# Patient Record
Sex: FEMALE | Race: WHITE | NOT HISPANIC OR LATINO | Employment: FULL TIME | URBAN - METROPOLITAN AREA
[De-identification: names, ages, dates, MRNs, and addresses within clinical notes are randomized per-mention and may not be internally consistent; named-entity substitution may affect disease eponyms.]

---

## 2023-01-12 ENCOUNTER — OFFICE VISIT (OUTPATIENT)
Dept: URGENT CARE | Facility: CLINIC | Age: 63
End: 2023-01-12
Payer: COMMERCIAL

## 2023-01-12 VITALS
RESPIRATION RATE: 18 BRPM | HEIGHT: 63 IN | DIASTOLIC BLOOD PRESSURE: 81 MMHG | HEART RATE: 74 BPM | TEMPERATURE: 99 F | BODY MASS INDEX: 23.04 KG/M2 | OXYGEN SATURATION: 99 % | WEIGHT: 130 LBS | SYSTOLIC BLOOD PRESSURE: 146 MMHG

## 2023-01-12 DIAGNOSIS — R35.0 FREQUENT URINATION: Primary | ICD-10-CM

## 2023-01-12 DIAGNOSIS — N39.0 ACUTE LOWER UTI (URINARY TRACT INFECTION): ICD-10-CM

## 2023-01-12 LAB
BILIRUB UR QL STRIP: NEGATIVE
GLUCOSE UR QL STRIP: NEGATIVE
KETONES UR QL STRIP: NEGATIVE
LEUKOCYTE ESTERASE UR QL STRIP: POSITIVE
PH, POC UA: 6.5
POC BLOOD, URINE: POSITIVE
POC NITRATES, URINE: NEGATIVE
PROT UR QL STRIP: NEGATIVE
SP GR UR STRIP: 1.01 (ref 1–1.03)
UROBILINOGEN UR STRIP-ACNC: ABNORMAL (ref 0.1–1.1)

## 2023-01-12 PROCEDURE — 81003 URINALYSIS AUTO W/O SCOPE: CPT | Mod: QW,,, | Performed by: PHYSICIAN ASSISTANT

## 2023-01-12 PROCEDURE — 3077F SYST BP >= 140 MM HG: CPT | Mod: CPTII,,, | Performed by: PHYSICIAN ASSISTANT

## 2023-01-12 PROCEDURE — 81003 POCT URINALYSIS, DIPSTICK, AUTOMATED, W/O SCOPE: ICD-10-PCS | Mod: QW,,, | Performed by: PHYSICIAN ASSISTANT

## 2023-01-12 PROCEDURE — 99203 OFFICE O/P NEW LOW 30 MIN: CPT | Mod: ,,, | Performed by: PHYSICIAN ASSISTANT

## 2023-01-12 PROCEDURE — 3077F PR MOST RECENT SYSTOLIC BLOOD PRESSURE >= 140 MM HG: ICD-10-PCS | Mod: CPTII,,, | Performed by: PHYSICIAN ASSISTANT

## 2023-01-12 PROCEDURE — 1160F RVW MEDS BY RX/DR IN RCRD: CPT | Mod: CPTII,,, | Performed by: PHYSICIAN ASSISTANT

## 2023-01-12 PROCEDURE — 3008F BODY MASS INDEX DOCD: CPT | Mod: CPTII,,, | Performed by: PHYSICIAN ASSISTANT

## 2023-01-12 PROCEDURE — 3079F PR MOST RECENT DIASTOLIC BLOOD PRESSURE 80-89 MM HG: ICD-10-PCS | Mod: CPTII,,, | Performed by: PHYSICIAN ASSISTANT

## 2023-01-12 PROCEDURE — 99203 PR OFFICE/OUTPT VISIT, NEW, LEVL III, 30-44 MIN: ICD-10-PCS | Mod: ,,, | Performed by: PHYSICIAN ASSISTANT

## 2023-01-12 PROCEDURE — 3008F PR BODY MASS INDEX (BMI) DOCUMENTED: ICD-10-PCS | Mod: CPTII,,, | Performed by: PHYSICIAN ASSISTANT

## 2023-01-12 PROCEDURE — 1159F MED LIST DOCD IN RCRD: CPT | Mod: CPTII,,, | Performed by: PHYSICIAN ASSISTANT

## 2023-01-12 PROCEDURE — 3079F DIAST BP 80-89 MM HG: CPT | Mod: CPTII,,, | Performed by: PHYSICIAN ASSISTANT

## 2023-01-12 PROCEDURE — 1160F PR REVIEW ALL MEDS BY PRESCRIBER/CLIN PHARMACIST DOCUMENTED: ICD-10-PCS | Mod: CPTII,,, | Performed by: PHYSICIAN ASSISTANT

## 2023-01-12 PROCEDURE — 1159F PR MEDICATION LIST DOCUMENTED IN MEDICAL RECORD: ICD-10-PCS | Mod: CPTII,,, | Performed by: PHYSICIAN ASSISTANT

## 2023-01-12 RX ORDER — NITROFURANTOIN 25; 75 MG/1; MG/1
100 CAPSULE ORAL 2 TIMES DAILY
Qty: 20 CAPSULE | Refills: 0 | Status: SHIPPED | OUTPATIENT
Start: 2023-01-12 | End: 2023-01-22

## 2023-01-12 RX ORDER — DESVENLAFAXINE SUCCINATE 50 MG/1
50 TABLET, EXTENDED RELEASE ORAL
COMMUNITY
Start: 2023-01-10

## 2023-01-12 RX ORDER — PHENAZOPYRIDINE HYDROCHLORIDE 200 MG/1
200 TABLET, FILM COATED ORAL 3 TIMES DAILY PRN
Qty: 6 TABLET | Refills: 0 | Status: SHIPPED | OUTPATIENT
Start: 2023-01-12 | End: 2023-01-17

## 2023-01-12 NOTE — PATIENT INSTRUCTIONS
Recommend antibiotic coverage concern for urinary tract infection.  Encourage plenty water and noncarbonated fluids hydration.  Recommend alternating Tylenol and ibuprofen every 4-6 hours if needed for pain or inflammation.  May use Pyridium to help with urinary discomfort bladder spasms but note will have common side effect of discoloring urine orange.  Recommend follow-up with primary care physician 1 week for re-evaluation if not improving.

## 2023-01-12 NOTE — PROGRESS NOTES
"Subjective:       Patient ID: Yamileth Hatch is a 62 y.o. female.    Vitals:  height is 5' 3" (1.6 m) and weight is 59 kg (130 lb). Her oral temperature is 98.7 °F (37.1 °C). Her blood pressure is 146/81 (abnormal) and her pulse is 74. Her respiration is 18 and oxygen saturation is 99%.     Chief Complaint: Urinary Frequency (Urinary frequency and pain after urination. Started last night.)    HPI     Urinary Frequency     Additional comments: Urinary frequency and pain after urination. Started   last night.      Constitution: Negative for chills and fever.   Gastrointestinal:  Negative for abdominal pain, nausea and vomiting.   Genitourinary:  Positive for dysuria, frequency and urgency. Negative for flank pain, bladder incontinence, hematuria and pelvic pain.   Musculoskeletal:  Negative for back pain and muscle ache.   Skin: Negative.    Psychiatric/Behavioral: Negative.       Objective:      Physical Exam   Constitutional: She is oriented to person, place, and time. She appears well-developed. She is cooperative. She does not appear ill. No distress.      Comments:Awake alert smiling ambulatory female     HENT:   Head: Normocephalic.   Mouth/Throat: Oropharynx is clear and moist and mucous membranes are normal.   Eyes: Lids are normal.   Neck: Trachea normal and phonation normal. Neck supple.   Cardiovascular: Normal rate, regular rhythm and normal pulses.   Pulmonary/Chest: Effort normal.   Abdominal: Normal appearance. She exhibits no distension. Soft. flat abdomen There is no abdominal tenderness. There is no rebound, no guarding, no left CVA tenderness and no right CVA tenderness.   Musculoskeletal: Normal range of motion.         General: Normal range of motion.   Neurological: no focal deficit. She is alert and oriented to person, place, and time. She has normal strength and normal reflexes.   Skin: Skin is warm, dry, intact and not diaphoretic.   Psychiatric: Her speech is normal and behavior is normal. " Mood, judgment and thought content normal.   Nursing note and vitals reviewed.      Assessment:       1. Frequent urination    2. Acute lower UTI (urinary tract infection)          Plan:       Recommend antibiotic coverage concern for urinary tract infection.  Encourage plenty water and noncarbonated fluids hydration.  Recommend alternating Tylenol and ibuprofen every 4-6 hours if needed for pain or inflammation.  May use Pyridium to help with urinary discomfort bladder spasms but note will have common side effect of discoloring urine orange.  Recommend follow-up with primary care physician 1 week for re-evaluation if not improving.  Frequent urination  -     POCT Urinalysis, Dipstick, Automated, W/O Scope    Acute lower UTI (urinary tract infection)  -     Urine Culture High Risk    Other orders  -     nitrofurantoin, macrocrystal-monohydrate, (MACROBID) 100 MG capsule; Take 1 capsule (100 mg total) by mouth 2 (two) times daily. for 10 days  Dispense: 20 capsule; Refill: 0  -     phenazopyridine (PYRIDIUM) 200 MG tablet; Take 1 tablet (200 mg total) by mouth 3 (three) times daily as needed for Pain (Bladder discomfort or spasms).  Dispense: 6 tablet; Refill: 0

## 2023-01-14 LAB — BACTERIA UR CULT: ABNORMAL

## 2023-11-16 ENCOUNTER — OFFICE VISIT (OUTPATIENT)
Dept: URGENT CARE | Facility: CLINIC | Age: 63
End: 2023-11-16
Payer: COMMERCIAL

## 2023-11-16 VITALS
SYSTOLIC BLOOD PRESSURE: 138 MMHG | DIASTOLIC BLOOD PRESSURE: 82 MMHG | RESPIRATION RATE: 20 BRPM | HEIGHT: 63 IN | OXYGEN SATURATION: 99 % | WEIGHT: 130 LBS | HEART RATE: 74 BPM | TEMPERATURE: 98 F | BODY MASS INDEX: 23.04 KG/M2

## 2023-11-16 DIAGNOSIS — N39.0 ACUTE UTI: ICD-10-CM

## 2023-11-16 DIAGNOSIS — R35.0 FREQUENCY OF MICTURITION: Primary | ICD-10-CM

## 2023-11-16 PROCEDURE — 99213 OFFICE O/P EST LOW 20 MIN: CPT | Mod: ,,, | Performed by: PHYSICIAN ASSISTANT

## 2023-11-16 PROCEDURE — 99213 PR OFFICE/OUTPT VISIT, EST, LEVL III, 20-29 MIN: ICD-10-PCS | Mod: ,,, | Performed by: PHYSICIAN ASSISTANT

## 2023-11-16 PROCEDURE — 87186 SC STD MICRODIL/AGAR DIL: CPT | Performed by: PHYSICIAN ASSISTANT

## 2023-11-16 RX ORDER — PHENAZOPYRIDINE HYDROCHLORIDE 200 MG/1
200 TABLET, FILM COATED ORAL 3 TIMES DAILY PRN
Qty: 6 TABLET | Refills: 0 | Status: SHIPPED | OUTPATIENT
Start: 2023-11-16 | End: 2023-11-21

## 2023-11-16 RX ORDER — NITROFURANTOIN 25; 75 MG/1; MG/1
100 CAPSULE ORAL 2 TIMES DAILY
Qty: 20 CAPSULE | Refills: 0 | Status: SHIPPED | OUTPATIENT
Start: 2023-11-16 | End: 2023-11-26

## 2023-11-16 NOTE — PATIENT INSTRUCTIONS
Recommend Macrobid antibiotic coverage for urinary tract infection concern.  Recommend plenty of water and noncarbonated fluids hydration and flushing of urinary tract system.  May continue Pyridium if needed for bladder pressure, pain cramps or spasms.  Recommend follow-up with primary care physician in 1 week for urinary tract infection re-evaluation if not improving.

## 2023-11-16 NOTE — PROGRESS NOTES
"Subjective:      Patient ID: Yamileth Hatch is a 63 y.o. female.    Vitals:  height is 5' 3" (1.6 m) and weight is 59 kg (130 lb). Her oral temperature is 98.3 °F (36.8 °C). Her blood pressure is 138/82 and her pulse is 74. Her respiration is 20 and oxygen saturation is 99%.     Chief Complaint: Dysuria (Burning on urination, pelvic pressure x 2 days. )    HPI  female reports 2 days ago developing urinary frequency urgency difficulty resting overnight starting over-the-counter [place] 0 yesterday slightly improved urinary symptoms reports this morning having pelvic pressure and mild dysuria given leftover prescription Pyridium by daughter having temporary relief of bladder spasms presents to urgent care for urinary tract infection concern evaluation.   Dysuria     Additional comments: Burning on urination, pelvic pressure x 2 days.         Constitution: Negative for chills and fever.   Gastrointestinal:  Negative for nausea and vomiting.   Genitourinary:  Positive for dysuria, frequency, urgency, urine decreased and pelvic pain. Negative for flank pain, bladder incontinence and hematuria.   Musculoskeletal:  Negative for back pain.   Skin: Negative.    Neurological:  Negative for numbness and tingling.   Psychiatric/Behavioral:  Positive for sleep disturbance.       Objective:     Physical Exam   Constitutional: She is oriented to person, place, and time. She appears well-developed. She is cooperative.  Non-toxic appearance. No distress.      Comments:Awake alert pleasant ambulatory female   normal  HENT:   Head: Normocephalic.   Mouth/Throat: Oropharynx is clear and moist and mucous membranes are normal.   Eyes: Lids are normal.   Neck: Trachea normal and phonation normal. Neck supple.   Cardiovascular: Normal rate, regular rhythm and normal pulses.   Pulmonary/Chest: Effort normal.   Abdominal: Normal appearance and bowel sounds are normal. She exhibits no distension. Soft. flat abdomen There is no abdominal tenderness. " "There is no rebound, no guarding, no left CVA tenderness and no right CVA tenderness.   Musculoskeletal: Normal range of motion.         General: Normal range of motion.   Neurological: no focal deficit. She is alert and oriented to person, place, and time. She has normal strength and normal reflexes. She displays no weakness.   Skin: Skin is warm, dry, intact and not diaphoretic.   Psychiatric: Her speech is normal and behavior is normal. Mood, judgment and thought content normal.   Nursing note and vitals reviewed.         Previous History      Review of patient's allergies indicates:  No Known Allergies    Past Medical History:   Diagnosis Date    Anxiety      Current Outpatient Medications   Medication Instructions    desvenlafaxine succinate (PRISTIQ) 50 mg, Oral    nitrofurantoin, macrocrystal-monohydrate, (MACROBID) 100 MG capsule 100 mg, Oral, 2 times daily    phenazopyridine (PYRIDIUM) 200 mg, Oral, 3 times daily PRN     Past Surgical History:   Procedure Laterality Date    KNEE LIGAMENT RECONSTRUCTION Right     TONSILLECTOMY       Family History   Problem Relation Age of Onset    Hypertension Mother        Social History     Tobacco Use    Smoking status: Every Day     Types: Vaping with nicotine    Smokeless tobacco: Never   Substance Use Topics    Alcohol use: Not Currently    Drug use: Never        Physical Exam      Vital Signs Reviewed   /82 (BP Location: Right arm)   Pulse 74   Temp 98.3 °F (36.8 °C) (Oral)   Resp 20   Ht 5' 3" (1.6 m)   Wt 59 kg (130 lb)   SpO2 99%   BMI 23.03 kg/m²        Procedures    Procedures     Labs     Results for orders placed or performed in visit on 01/12/23   Urine Culture High Risk    Specimen: Urine, Clean Catch   Result Value Ref Range    Urine Culture >/= 100,000 colonies/ml Escherichia coli (A)        Susceptibility    Escherichia coli -  (no method available)     Amox/K Clav 8 Sensitive      Ampicillin >=32 Resistant      Cefepime <=0.12 Sensitive      " Ceftriaxone <=0.25 Sensitive      Cefuroxime 4 Sensitive      Ciprofloxacin <=0.25 Sensitive      Gentamicin <=1 Sensitive      Levofloxacin <=0.12 Sensitive      Meropenem <=0.25 Sensitive      Nitrofurantoin <=16 Sensitive      Piperacillin/Tazobactam <=4 Sensitive      Trimethoprim/Sulfamethoxazole >=320 Resistant      Tetracycline <=1 Sensitive      Tobramycin <=1 Sensitive    POCT Urinalysis, Dipstick, Automated, W/O Scope   Result Value Ref Range    POC Blood, Urine Positive (A) Negative    POC Bilirubin, Urine Negative Negative    POC Urobilinogen, Urine NORM 0.1 - 1.1    POC Ketones, Urine Negative Negative    POC Protein, Urine Negative Negative    POC Nitrates, Urine Negative Negative    POC Glucose, Urine Negative Negative    pH, UA 6.5     POC Specific Gravity, Urine 1.015 1.003 - 1.029    POC Leukocytes, Urine Positive (A) Negative       Assessment:     1. Frequency of micturition    2. Acute UTI      Urinalysis complicated by [place] 0 recent medication intake.  Patient with urinary tract infection symptoms plan for antibiotic coverage with pending urine culture.  Patient reports successful relief of bladder infection with Macrobid 11 months ago.  Patient requesting prescription Pyridium for self to return leftover Rx to her daughter.  Patient understands discharge plan with antibiotic coverage hydration monitoring and we will continue to follow urine culture for any further care plan changes.  Patient verbalized understanding satisfied ready for discharge now    Plan:     Recommend Macrobid antibiotic coverage for urinary tract infection concern.  Recommend plenty of water and noncarbonated fluids hydration and flushing of urinary tract system.  May continue Pyridium if needed for bladder pressure, pain cramps or spasms.  Recommend follow-up with primary care physician in 1 week for urinary tract infection re-evaluation if not improving.  Frequency of micturition  -     Cancel: POCT Urinalysis, Dipstick,  Manual, W/O Scope    Acute UTI  -     Urine Culture High Risk    Other orders  -     phenazopyridine (PYRIDIUM) 200 MG tablet; Take 1 tablet (200 mg total) by mouth 3 (three) times daily as needed (Bladder pain or spasms).  Dispense: 6 tablet; Refill: 0  -     nitrofurantoin, macrocrystal-monohydrate, (MACROBID) 100 MG capsule; Take 1 capsule (100 mg total) by mouth 2 (two) times daily. for 10 days  Dispense: 20 capsule; Refill: 0

## 2023-11-18 LAB — BACTERIA UR CULT: ABNORMAL

## 2023-11-29 ENCOUNTER — TELEPHONE (OUTPATIENT)
Dept: URGENT CARE | Facility: CLINIC | Age: 63
End: 2023-11-29
Payer: COMMERCIAL

## 2023-11-29 RX ORDER — CIPROFLOXACIN 500 MG/1
500 TABLET ORAL 2 TIMES DAILY
Qty: 14 TABLET | Refills: 0 | Status: SHIPPED | OUTPATIENT
Start: 2023-11-29 | End: 2023-12-06

## 2023-11-29 NOTE — TELEPHONE ENCOUNTER
Pt states that she was here 11/16/23 for frequent urination, she was given medication but states that it didn't work and is asking for something stronger.

## 2023-11-29 NOTE — TELEPHONE ENCOUNTER
Pt was given instruction to start taking the ciprofloxacin antibiotic that was recommended by Jan Jacobo. Pt voiced understanding and thanks. Pt also said she will be going to her gynecologist next week to follow up with her urinary symptoms.